# Patient Record
Sex: FEMALE | Employment: OTHER | URBAN - METROPOLITAN AREA
[De-identification: names, ages, dates, MRNs, and addresses within clinical notes are randomized per-mention and may not be internally consistent; named-entity substitution may affect disease eponyms.]

---

## 2017-03-23 ENCOUNTER — HOSPITAL ENCOUNTER (OUTPATIENT)
Dept: CT IMAGING | Age: 32
Discharge: HOME | End: 2017-03-23
Attending: ANESTHESIOLOGY
Payer: MEDICARE

## 2017-03-23 DIAGNOSIS — M51.26 LUMBAR DISCOGENIC PAIN SYNDROME: ICD-10-CM

## 2017-03-23 PROCEDURE — 72131 CT LUMBAR SPINE W/O DYE: CPT

## 2017-04-12 ENCOUNTER — HOSPITAL ENCOUNTER (OUTPATIENT)
Dept: CT IMAGING | Age: 32
Discharge: HOME | End: 2017-04-12
Attending: ANESTHESIOLOGY
Payer: MEDICARE

## 2017-04-12 DIAGNOSIS — M54.2 CERVICAL PAIN: ICD-10-CM

## 2017-04-12 PROCEDURE — 72125 CT NECK SPINE W/O DYE: CPT

## 2017-09-06 ENCOUNTER — HOSPITAL ENCOUNTER (OUTPATIENT)
Dept: CT IMAGING | Age: 32
Discharge: HOME | End: 2017-09-06
Attending: ORTHOPAEDIC SURGERY
Payer: MEDICARE

## 2017-09-06 DIAGNOSIS — M51.17 INTERVERTEBRAL DISC DISORDER WITH RADICULOPATHY OF LUMBOSACRAL REGION: ICD-10-CM

## 2017-09-06 DIAGNOSIS — M51.36 DDD (DEGENERATIVE DISC DISEASE), LUMBAR: ICD-10-CM

## 2017-09-06 PROCEDURE — 74176 CT ABD & PELVIS W/O CONTRAST: CPT | Performed by: ORTHOPAEDIC SURGERY

## 2018-03-12 ENCOUNTER — TELEPHONE (OUTPATIENT)
Dept: SURGERY | Facility: CLINIC | Age: 33
End: 2018-03-12

## 2018-09-14 ENCOUNTER — HOSPITAL ENCOUNTER (OUTPATIENT)
Age: 33
Discharge: HOME | End: 2018-09-14
Attending: EMERGENCY MEDICINE
Payer: MEDICARE

## 2018-09-14 VITALS
HEART RATE: 125 BPM | SYSTOLIC BLOOD PRESSURE: 125 MMHG | BODY MASS INDEX: 24.17 KG/M2 | DIASTOLIC BLOOD PRESSURE: 84 MMHG | RESPIRATION RATE: 18 BRPM | OXYGEN SATURATION: 99 % | WEIGHT: 128 LBS | TEMPERATURE: 99 F | HEIGHT: 61 IN

## 2018-09-14 DIAGNOSIS — K04.7 DENTAL INFECTION: Primary | ICD-10-CM

## 2018-09-14 PROCEDURE — 99203 OFFICE O/P NEW LOW 30 MIN: CPT

## 2018-09-14 RX ORDER — CLINDAMYCIN HYDROCHLORIDE 300 MG/1
300 CAPSULE ORAL 3 TIMES DAILY
Qty: 30 CAPSULE | Refills: 0 | Status: SHIPPED | OUTPATIENT
Start: 2018-09-14 | End: 2018-09-24

## 2018-09-14 RX ORDER — NORGESTIMATE AND ETHINYL ESTRADIOL 7DAYSX3 28
KIT ORAL
COMMUNITY
Start: 2018-08-31

## 2018-09-14 RX ORDER — IBUPROFEN 600 MG/1
TABLET ORAL
COMMUNITY
Start: 2018-09-14

## 2018-09-14 RX ORDER — CELECOXIB 200 MG/1
CAPSULE ORAL
COMMUNITY
Start: 2018-09-04

## 2018-09-14 RX ORDER — BACLOFEN 10 MG/1
TABLET ORAL
COMMUNITY
Start: 2018-08-24

## 2018-09-14 RX ORDER — FENTANYL 25 UG/H
PATCH TRANSDERMAL
Refills: 0 | COMMUNITY
Start: 2018-09-04

## 2018-09-14 NOTE — ED INITIAL ASSESSMENT (HPI)
PT WITH SWELLING TO LEFT SIDE OF FACE SINCE LAST NIGHT  PT NEEDS TO HAVE A ROOT CANAL ON Tuesday (HAS APPT)  PT WAS PRESCRIBED AMOXICILLIN TODAY FOR INFECTION

## 2018-09-14 NOTE — ED PROVIDER NOTES
Patient Seen in: [facility] In [address] [place] [address]    History   Patient presents with:  Swelling Edema (cardiovascular, metabolic)    Stated Complaint: swollen face    HPI    Patient is a 64-year-old female without significant past medical his swelling and erythema over the left face, lateral to the nasolabial fold  There is swelling and tenderness of the gingiva superior to the left upper first molar  Caries noted  No fluctuance  Pharynx: No swelling, no erythema, no drooling trismus or stridor